# Patient Record
Sex: MALE | Race: WHITE | Employment: FULL TIME | URBAN - METROPOLITAN AREA
[De-identification: names, ages, dates, MRNs, and addresses within clinical notes are randomized per-mention and may not be internally consistent; named-entity substitution may affect disease eponyms.]

---

## 2022-12-06 ENCOUNTER — TELEPHONE (OUTPATIENT)
Dept: INTERNAL MEDICINE CLINIC | Age: 67
End: 2022-12-06

## 2023-03-02 ENCOUNTER — OFFICE VISIT (OUTPATIENT)
Dept: INTERNAL MEDICINE CLINIC | Age: 68
End: 2023-03-02
Payer: SELF-PAY

## 2023-03-02 VITALS
HEIGHT: 70 IN | BODY MASS INDEX: 24.05 KG/M2 | HEART RATE: 78 BPM | SYSTOLIC BLOOD PRESSURE: 134 MMHG | RESPIRATION RATE: 18 BRPM | DIASTOLIC BLOOD PRESSURE: 80 MMHG | TEMPERATURE: 97.6 F | OXYGEN SATURATION: 97 % | WEIGHT: 168 LBS

## 2023-03-02 DIAGNOSIS — G25.0 BENIGN ESSENTIAL TREMOR: ICD-10-CM

## 2023-03-02 DIAGNOSIS — Z12.5 PROSTATE CANCER SCREENING: ICD-10-CM

## 2023-03-02 DIAGNOSIS — M10.9 GOUT, UNSPECIFIED CAUSE, UNSPECIFIED CHRONICITY, UNSPECIFIED SITE: ICD-10-CM

## 2023-03-02 DIAGNOSIS — E78.00 HIGH CHOLESTEROL: ICD-10-CM

## 2023-03-02 DIAGNOSIS — J30.1 ALLERGIC RHINITIS DUE TO POLLEN, UNSPECIFIED SEASONALITY: ICD-10-CM

## 2023-03-02 DIAGNOSIS — Z11.59 NEED FOR HEPATITIS C SCREENING TEST: ICD-10-CM

## 2023-03-02 DIAGNOSIS — Z00.00 MEDICARE ANNUAL WELLNESS VISIT, SUBSEQUENT: Primary | ICD-10-CM

## 2023-03-02 PROCEDURE — G0463 HOSPITAL OUTPT CLINIC VISIT: HCPCS | Performed by: INTERNAL MEDICINE

## 2023-03-02 RX ORDER — TRIAMCINOLONE ACETONIDE 55 UG/1
2 SPRAY, METERED NASAL DAILY
COMMUNITY
Start: 2023-03-02

## 2023-03-02 RX ORDER — TADALAFIL 20 MG/1
20 TABLET ORAL
Qty: 30 TABLET | Refills: 3 | Status: SHIPPED | OUTPATIENT
Start: 2023-03-02

## 2023-03-02 NOTE — PROGRESS NOTES
Chief Complaint   Patient presents with    [facility] Care    Annual Wellness Visit     Blood pressure (!) 167/68, pulse 78, temperature 97.6 °F (36.4 °C), temperature source Temporal, resp. rate 18, height 5' 10.2\" (1.783 m), weight 168 lb (76.2 kg), SpO2 97 %.

## 2023-03-02 NOTE — PATIENT INSTRUCTIONS
Medicare Wellness Visit, Male    The best way to live healthy is to have a lifestyle where you eat a well-balanced diet, exercise regularly, limit alcohol use, and quit all forms of tobacco/nicotine, if applicable. Regular preventive services are another way to keep healthy. Preventive services (vaccines, screening tests, monitoring & exams) can help personalize your care plan, which helps you manage your own care. Screening tests can find health problems at the earliest stages, when they are easiest to treat. [facility] follows the current, evidence-based guidelines published by the [facility] Gurvinder ([facility]) when recommending preventive services for our patients. Because we follow these guidelines, sometimes recommendations change over time as research supports it. (For example, a prostate screening blood test is no longer routinely recommended for men with no symptoms). Of course, you and your doctor may decide to screen more often for some diseases, based on your risk and co-morbidities (chronic disease you are already diagnosed with). Preventive services for you include:  - Medicare offers their members a free annual wellness visit, which is time for you and your primary care provider to discuss and plan for your preventive service needs.  Take advantage of this benefit every year!    -Over the age of 72 should receive the recommended pneumonia vaccines.   -All adults should have a flu vaccine yearly.  -All adults should receive a tetanus vaccine every 10 years.  -Over the age of 48 should receive the shingles vaccines.    -All adults should be screened once for Hepatitis C.  -All adults age 38-68 who are overweight should have a diabetes screening test once every three years.   -Other screening tests & preventive services for persons with diabetes include: an eye exam to screen for diabetic retinopathy, a kidney function test, a foot exam, and stricter control over your cholesterol.   -Cardiovascular screening for adults with routine risk involves an electrocardiogram (ECG) at intervals determined by the provider.     -Colorectal cancer screening should be done for adults age 43-69 with no increased risk factors for colorectal cancer. There are a number of acceptable methods of screening for this type of cancer. Each test has its own benefits and drawbacks. Discuss with your provider what is most appropriate for you during your annual wellness visit. The different tests include: colonoscopy (considered the best screening method), a fecal occult blood test, a fecal DNA test, and sigmoidoscopy.    -Lung cancer screening is recommended annually with a low dose CT scan for adults between age 54 and 68, who have smoked at least 30 pack years (equivalent of 1 pack per day for 30 days), and who is a current smoker or quit less than 15 years ago. -An Abdominal Aortic Aneurysm (AAA) Screening is recommended for men age 73-68 who has ever smoked in their lifetime.      Here is a list of your current Health Maintenance items (your personalized list of preventive services) with a due date:  Health Maintenance Due   Topic Date Due    Hepatitis C Test  Never done    COVID-19 Vaccine (1) Never done    Colorectal Screening  Never done    Shingles Vaccine (1 of 2) Never done    Cholesterol Test   10/08/2015    AAA Screening  Never done    Pneumococcal Vaccine (1 - PCV) Never done    DTaP/Tdap/Td  (2 - Td or Tdap) 10/08/2020    Yearly Flu Vaccine (1) Never done

## 2023-03-02 NOTE — LETTER
3/2/2023 4:20 PM    [address] [place] [address]          Dear Dr. Myrtha Denver,    Please fax us the most recent Colonoscopy & Pathology so that we may update the patient's records for continuity of care.      Our fax number: [phone]    Patient:   Ijeoma Connell  1955              Sincerely,      Clare Laura MD

## 2023-03-02 NOTE — PROGRESS NOTES
This is the Subsequent Medicare Annual Wellness Exam, performed 12 months or more after the Initial AWV or the last Subsequent AWV    I have reviewed the patient's medical history in detail and updated the computerized patient record. Also for a new patient appointment. He is transferring care as his physician retired. He has a history of gout. He has a history of some whitecoat hypertension. He also has a history of erectile dysfunction and has previously used Cialis with good intent. He denies any recent episodes of gout. He is walking an average of 12 miles per day. Has some blisters on his feet intermittently but otherwise a complete review of systems is negative. Physical Examination: General appearance - alert, well appearing, and in no distress  Mental status - alert, oriented to person, place, and time  Ears - bilateral TM's and external ear canals normal  Mouth - mucous membranes moist, pharynx normal without lesions  Neck - supple, no significant adenopathy  Lymphatics - no palpable lymphadenopathy, no hepatosplenomegaly  Chest - clear to auscultation, no wheezes, rales or rhonchi, symmetric air entry  Heart - normal rate, regular rhythm, normal S1, S2, no murmurs, rubs, clicks or gallops  Abdomen - soft, nontender, nondistended, no masses or organomegaly  Neurological - alert, oriented, normal speech, no focal findings or movement disorder noted  Musculoskeletal - no joint tenderness, deformity or swelling  Extremities - peripheral pulses normal, no pedal edema, no clubbing or cyanosis    Assessment/Plan   Education and counseling provided:  Are appropriate based on today's review and evaluation  End-of-Life planning (with patient's consent)  Prostate cancer screening tests (PSA, covered annually)  Diabetes screening test    1. Medicare annual wellness visit, subsequent  2. High cholesterol-diet controlled. At low risk for coronary disease. We will recheck levels.   -     METABOLIC PANEL, COMPREHENSIVE; Future  -     CBC WITH AUTOMATED DIFF; Future  -     LIPID PANEL; Future  -     TSH 3RD GENERATION; Future  3. Gout, unspecified cause, unspecified chronicity, unspecified site-stable on allopurinol.  -     TSH 3RD GENERATION; Future  -     URIC ACID; Future  -     URINALYSIS W/MICROSCOPIC; Future  4. Benign essential tremor-functional with no medications.  -     TSH 3RD GENERATION; Future  -     URINALYSIS W/MICROSCOPIC; Future  5. Allergic rhinitis due to pollen, unspecified seasonality-stable on as needed over-the-counter meds. 6. Prostate cancer screening  -     PSA SCREENING (SCREENING); Future  7. Need for hepatitis C screening test  -     HEPATITIS C AB; Future  8. ED-refill Cialis to use as needed. Depression Risk Factor Screening     3 most recent PHQ Screens 3/2/2023   Little interest or pleasure in doing things Not at all   Feeling down, depressed, irritable, or hopeless Not at all   Total Score PHQ 2 0       Alcohol & Drug Abuse Risk Screen    Do you average more than 1 drink per night or more than 7 drinks a week: No    In the past three months have you have had more than 4 drinks containing alcohol on one occasion: No          Functional Ability and Level of Safety    Hearing: The patient wears hearing aids. Activities of Daily Living: The home contains: no safety equipment. Patient does total self care      Ambulation: with no difficulty     Fall Risk:  Fall Risk Assessment, last 12 mths 3/2/2023   Able to walk? Yes   Fall in past 12 months? 0   Do you feel unsteady?  0   Are you worried about falling 0      Abuse Screen:  Patient is not abused       Cognitive Screening    Has your family/caregiver stated any concerns about your memory: no         Health Maintenance Due     Health Maintenance Due   Topic Date Due    Hepatitis C Screening  Never done    COVID-19 Vaccine (1) Never done    Colorectal Cancer Screening Combo  Never done    Shingles Vaccine (1 of 2) Never done Lipid Screen  10/08/2015    AAA Screening 73-69 YO Male Smoking Patients  Never done    Pneumococcal 65+ years (1 - PCV) Never done    DTaP/Tdap/Td series (2 - Td or Tdap) 10/08/2020    Flu Vaccine (1) Never done       Patient Care Team   Patient Care Team:  Glen Adame MD as PCP - General (Internal Medicine Physician)    History     Patient Active Problem List   Diagnosis Code    Gout M10.9    High cholesterol E78.00    Benign essential tremor G25.0    AR (allergic rhinitis) J30.9     Past Medical History:   Diagnosis Date    AR (allergic rhinitis)     Benign essential tremor     Gout 2010    High cholesterol     Kidney stone     Migraine       Past Surgical History:   Procedure Laterality Date    HX APPENDECTOMY  2003    HX TONSILLECTOMY       Current Outpatient Medications   Medication Sig Dispense Refill    triamcinolone (Nasacort) 55 mcg nasal inhaler 2 Sprays by Both Nostrils route daily. tadalafiL (CIALIS) 20 mg tablet Take 1 Tablet by mouth daily as needed for Erectile Dysfunction. 30 Tablet 3    allopurinol (ZYLOPRIM) 300 mg tablet Take 1 Tab by mouth daily.  80 Tab 3     No Known Allergies    Family History   Problem Relation Age of Onset    Hypertension Mother     Cancer Mother         oral    Cancer Father         prostate    Hypertension Sister     Hypertension Sister      Social History     Tobacco Use    Smoking status: Former     Packs/day: 0.50     Years: 5.00     Pack years: 2.50     Types: Cigarettes     Quit date: 10/8/1979     Years since quittin.4    Smokeless tobacco: Not on file   Substance Use Topics    Alcohol use: No         Matt Seymour MD

## 2023-03-03 ENCOUNTER — PATIENT MESSAGE (OUTPATIENT)
Dept: INTERNAL MEDICINE CLINIC | Age: 68
End: 2023-03-03

## 2023-03-03 LAB
ALBUMIN SERPL-MCNC: 4.5 G/DL (ref 3.5–5)
ALBUMIN/GLOB SERPL: 1.7 (ref 1.1–2.2)
ALP SERPL-CCNC: 66 U/L (ref 45–117)
ALT SERPL-CCNC: 21 U/L (ref 12–78)
ANION GAP SERPL CALC-SCNC: 6 MMOL/L (ref 5–15)
APPEARANCE UR: CLEAR
AST SERPL-CCNC: 24 U/L (ref 15–37)
BACTERIA URNS QL MICRO: NEGATIVE /HPF
BASOPHILS # BLD: 0 K/UL (ref 0–0.1)
BASOPHILS NFR BLD: 0 % (ref 0–1)
BILIRUB SERPL-MCNC: 3 MG/DL (ref 0.2–1)
BILIRUB UR QL: NEGATIVE
BUN SERPL-MCNC: 22 MG/DL (ref 6–20)
BUN/CREAT SERPL: 21 (ref 12–20)
CALCIUM SERPL-MCNC: 9.8 MG/DL (ref 8.5–10.1)
CHLORIDE SERPL-SCNC: 101 MMOL/L (ref 97–108)
CHOLEST SERPL-MCNC: 222 MG/DL
CO2 SERPL-SCNC: 31 MMOL/L (ref 21–32)
COLOR UR: ABNORMAL
CREAT SERPL-MCNC: 1.06 MG/DL (ref 0.7–1.3)
DIFFERENTIAL METHOD BLD: NORMAL
EOSINOPHIL # BLD: 0.1 K/UL (ref 0–0.4)
EOSINOPHIL NFR BLD: 3 % (ref 0–7)
EPITH CASTS URNS QL MICRO: ABNORMAL /LPF
ERYTHROCYTE [DISTWIDTH] IN BLOOD BY AUTOMATED COUNT: 11.9 % (ref 11.5–14.5)
GLOBULIN SER CALC-MCNC: 2.6 G/DL (ref 2–4)
GLUCOSE SERPL-MCNC: 94 MG/DL (ref 65–100)
GLUCOSE UR STRIP.AUTO-MCNC: NEGATIVE MG/DL
HCT VFR BLD AUTO: 47.6 % (ref 36.6–50.3)
HCV AB SERPL QL IA: NONREACTIVE
HDLC SERPL-MCNC: 67 MG/DL
HDLC SERPL: 3.3 (ref 0–5)
HGB BLD-MCNC: 15.5 G/DL (ref 12.1–17)
HGB UR QL STRIP: NEGATIVE
IMM GRANULOCYTES # BLD AUTO: 0 K/UL (ref 0–0.04)
IMM GRANULOCYTES NFR BLD AUTO: 0 % (ref 0–0.5)
KETONES UR QL STRIP.AUTO: NEGATIVE MG/DL
LDLC SERPL CALC-MCNC: 142.2 MG/DL (ref 0–100)
LEUKOCYTE ESTERASE UR QL STRIP.AUTO: ABNORMAL
LYMPHOCYTES # BLD: 1 K/UL (ref 0.8–3.5)
LYMPHOCYTES NFR BLD: 21 % (ref 12–49)
MCH RBC QN AUTO: 31.1 PG (ref 26–34)
MCHC RBC AUTO-ENTMCNC: 32.6 G/DL (ref 30–36.5)
MCV RBC AUTO: 95.6 FL (ref 80–99)
MONOCYTES # BLD: 0.3 K/UL (ref 0–1)
MONOCYTES NFR BLD: 6 % (ref 5–13)
NEUTS SEG # BLD: 3.2 K/UL (ref 1.8–8)
NEUTS SEG NFR BLD: 70 % (ref 32–75)
NITRITE UR QL STRIP.AUTO: NEGATIVE
NRBC # BLD: 0 K/UL (ref 0–0.01)
NRBC BLD-RTO: 0 PER 100 WBC
PH UR STRIP: 6 (ref 5–8)
PLATELET # BLD AUTO: 177 K/UL (ref 150–400)
PMV BLD AUTO: 10.9 FL (ref 8.9–12.9)
POTASSIUM SERPL-SCNC: 4.1 MMOL/L (ref 3.5–5.1)
PROT SERPL-MCNC: 7.1 G/DL (ref 6.4–8.2)
PROT UR STRIP-MCNC: NEGATIVE MG/DL
PSA SERPL-MCNC: 4.4 NG/ML (ref 0.01–4)
RBC # BLD AUTO: 4.98 M/UL (ref 4.1–5.7)
RBC #/AREA URNS HPF: ABNORMAL /HPF (ref 0–5)
SODIUM SERPL-SCNC: 138 MMOL/L (ref 136–145)
SP GR UR REFRACTOMETRY: 1.01 (ref 1–1.03)
TRIGL SERPL-MCNC: 64 MG/DL (ref ?–150)
TSH SERPL DL<=0.05 MIU/L-ACNC: 1.08 UIU/ML (ref 0.36–3.74)
URATE SERPL-MCNC: 4.2 MG/DL (ref 3.5–7.2)
UROBILINOGEN UR QL STRIP.AUTO: 0.2 EU/DL (ref 0.2–1)
VLDLC SERPL CALC-MCNC: 12.8 MG/DL
WBC # BLD AUTO: 4.6 K/UL (ref 4.1–11.1)
WBC URNS QL MICRO: ABNORMAL /HPF (ref 0–4)

## 2023-03-06 NOTE — TELEPHONE ENCOUNTER
Faxed records to Dr. Aury Ochoa at fax number [phone] through [employer]. Notified pt via [facility].

## 2023-08-15 LAB — PSA, EXTERNAL: 4.78

## 2024-05-06 SDOH — ECONOMIC STABILITY: HOUSING INSECURITY
IN THE LAST 12 MONTHS, WAS THERE A TIME WHEN YOU DID NOT HAVE A STEADY PLACE TO SLEEP OR SLEPT IN A SHELTER (INCLUDING NOW)?: NO

## 2024-05-06 SDOH — ECONOMIC STABILITY: FOOD INSECURITY: WITHIN THE PAST 12 MONTHS, YOU WORRIED THAT YOUR FOOD WOULD RUN OUT BEFORE YOU GOT MONEY TO BUY MORE.: NEVER TRUE

## 2024-05-06 SDOH — ECONOMIC STABILITY: TRANSPORTATION INSECURITY
IN THE PAST 12 MONTHS, HAS LACK OF TRANSPORTATION KEPT YOU FROM MEETINGS, WORK, OR FROM GETTING THINGS NEEDED FOR DAILY LIVING?: NO

## 2024-05-06 SDOH — ECONOMIC STABILITY: INCOME INSECURITY: HOW HARD IS IT FOR YOU TO PAY FOR THE VERY BASICS LIKE FOOD, HOUSING, MEDICAL CARE, AND HEATING?: NOT HARD AT ALL

## 2024-05-06 SDOH — HEALTH STABILITY: PHYSICAL HEALTH: ON AVERAGE, HOW MANY DAYS PER WEEK DO YOU ENGAGE IN MODERATE TO STRENUOUS EXERCISE (LIKE A BRISK WALK)?: 7 DAYS

## 2024-05-06 SDOH — ECONOMIC STABILITY: FOOD INSECURITY: WITHIN THE PAST 12 MONTHS, THE FOOD YOU BOUGHT JUST DIDN'T LAST AND YOU DIDN'T HAVE MONEY TO GET MORE.: NEVER TRUE

## 2024-05-06 SDOH — HEALTH STABILITY: PHYSICAL HEALTH: ON AVERAGE, HOW MANY MINUTES DO YOU ENGAGE IN EXERCISE AT THIS LEVEL?: 150+ MIN

## 2024-05-06 ASSESSMENT — LIFESTYLE VARIABLES
HOW MANY STANDARD DRINKS CONTAINING ALCOHOL DO YOU HAVE ON A TYPICAL DAY: PATIENT DOES NOT DRINK
HOW OFTEN DO YOU HAVE A DRINK CONTAINING ALCOHOL: NEVER
HOW OFTEN DO YOU HAVE SIX OR MORE DRINKS ON ONE OCCASION: 1
HOW MANY STANDARD DRINKS CONTAINING ALCOHOL DO YOU HAVE ON A TYPICAL DAY: 0
HOW OFTEN DO YOU HAVE A DRINK CONTAINING ALCOHOL: 1

## 2024-05-06 ASSESSMENT — PATIENT HEALTH QUESTIONNAIRE - PHQ9
1. LITTLE INTEREST OR PLEASURE IN DOING THINGS: NOT AT ALL
SUM OF ALL RESPONSES TO PHQ QUESTIONS 1-9: 0
SUM OF ALL RESPONSES TO PHQ9 QUESTIONS 1 & 2: 0
2. FEELING DOWN, DEPRESSED OR HOPELESS: NOT AT ALL

## 2024-05-09 ENCOUNTER — OFFICE VISIT (OUTPATIENT)
Age: 69
End: 2024-05-09
Payer: MEDICARE

## 2024-05-09 VITALS
OXYGEN SATURATION: 100 % | RESPIRATION RATE: 15 BRPM | DIASTOLIC BLOOD PRESSURE: 82 MMHG | WEIGHT: 168.4 LBS | HEIGHT: 70 IN | HEART RATE: 63 BPM | BODY MASS INDEX: 24.11 KG/M2 | SYSTOLIC BLOOD PRESSURE: 140 MMHG | TEMPERATURE: 98 F

## 2024-05-09 DIAGNOSIS — Z13.6 ENCOUNTER FOR SCREENING FOR ABDOMINAL AORTIC ANEURYSM (AAA) IN PATIENT 50 YEARS OF AGE OR OLDER WITHOUT OTHER RISK FACTORS FOR AAA: ICD-10-CM

## 2024-05-09 DIAGNOSIS — M10.9 GOUT, UNSPECIFIED CAUSE, UNSPECIFIED CHRONICITY, UNSPECIFIED SITE: ICD-10-CM

## 2024-05-09 DIAGNOSIS — Z12.5 ENCOUNTER FOR SCREENING FOR MALIGNANT NEOPLASM OF PROSTATE: ICD-10-CM

## 2024-05-09 DIAGNOSIS — H61.92 SKIN LESION OF LEFT EAR: ICD-10-CM

## 2024-05-09 DIAGNOSIS — F17.211 NICOTINE DEPENDENCE, CIGARETTES, IN REMISSION: ICD-10-CM

## 2024-05-09 DIAGNOSIS — E78.00 PURE HYPERCHOLESTEROLEMIA, UNSPECIFIED: ICD-10-CM

## 2024-05-09 DIAGNOSIS — R97.20 ELEVATED PSA: ICD-10-CM

## 2024-05-09 DIAGNOSIS — J30.1 SEASONAL ALLERGIC RHINITIS DUE TO POLLEN: ICD-10-CM

## 2024-05-09 DIAGNOSIS — Z00.00 MEDICARE ANNUAL WELLNESS VISIT, SUBSEQUENT: Primary | ICD-10-CM

## 2024-05-09 PROCEDURE — G8427 DOCREV CUR MEDS BY ELIG CLIN: HCPCS | Performed by: INTERNAL MEDICINE

## 2024-05-09 PROCEDURE — G0439 PPPS, SUBSEQ VISIT: HCPCS | Performed by: INTERNAL MEDICINE

## 2024-05-09 PROCEDURE — 1123F ACP DISCUSS/DSCN MKR DOCD: CPT | Performed by: INTERNAL MEDICINE

## 2024-05-09 PROCEDURE — 1036F TOBACCO NON-USER: CPT | Performed by: INTERNAL MEDICINE

## 2024-05-09 PROCEDURE — 99214 OFFICE O/P EST MOD 30 MIN: CPT | Performed by: INTERNAL MEDICINE

## 2024-05-09 PROCEDURE — G8420 CALC BMI NORM PARAMETERS: HCPCS | Performed by: INTERNAL MEDICINE

## 2024-05-09 PROCEDURE — 3017F COLORECTAL CA SCREEN DOC REV: CPT | Performed by: INTERNAL MEDICINE

## 2024-05-09 PROCEDURE — 4130F TOPICAL PREP RX AOE: CPT | Performed by: INTERNAL MEDICINE

## 2024-05-09 RX ORDER — BERBERINE CHLOR/SEAWEED/CHROM 500-250 MG
CAPSULE ORAL
COMMUNITY

## 2024-05-09 RX ORDER — DIPHENHYDRAMINE HCL 25 MG
25 CAPSULE ORAL EVERY 6 HOURS PRN
COMMUNITY

## 2024-05-09 NOTE — PROGRESS NOTES
values have been reviewed and are found in Flowsheets. The following problems were reviewed today and where indicated follow up appointments were made and/or referrals ordered.    Positive Risk Factor Screenings with Interventions:                Activity, Diet, and Weight:  On average, how many days per week do you engage in moderate to strenuous exercise (like a brisk walk)?: 7 days  On average, how many minutes do you engage in exercise at this level?: 150+ min    Do you eat balanced/healthy meals regularly?: (!) No    Body mass index is 24.03 kg/m².    Do you eat balanced/healthy meals regularly Interventions:  See AVS for additional education material                           Objective   Vitals:    05/09/24 1326   BP: (!) 171/96   Pulse: 63   Resp: 15   Temp: 98 °F (36.7 °C)   SpO2: 100%   Weight: 76.4 kg (168 lb 6.4 oz)   Height: 1.783 m (5' 10.2\")      Body mass index is 24.03 kg/m².        General Appearance: alert and oriented to person, place and time, well-developed and well-nourished, in no acute distress  Head: normocephalic and atraumatic  ENT: tympanic membrane, external ear and ear canal normal bilaterally, oropharynx clear and moist with normal mucous membranes  Neck: neck supple and non tender without mass, no thyromegaly or thyroid nodules, no cervical lymphadenopathy   Pulmonary/Chest: clear to auscultation bilaterally- no wheezes, rales or rhonchi, normal air movement, no respiratory distress  Cardiovascular: normal rate, normal S1 and S2, no gallops, intact distal pulses, and no carotid bruits  Abdomen: soft, non-tender, non-distended, normal bowel sounds, no masses or organomegaly  Extremities: no cyanosis and no clubbing  Musculoskeletal: normal range of motion, no joint swelling, deformity or tenderness  Neurologic: gait and coordination normal and speech normal  Large cystic structure on the left pinna with a?  Small ulcerated area in the center.      No Known Allergies  Prior to Visit

## 2024-05-09 NOTE — PATIENT INSTRUCTIONS
Eating Healthy Foods: Care Instructions  With every meal, you can make healthy food choices. Try to eat a variety of fruits, vegetables, whole grains, lean proteins, and low-fat dairy products. This can help you get the right balance of nutrients, including vitamins and minerals. Small changes add up over time. You can start by adding one healthy food to your meals each day.    Try to make half your plate fruits and vegetables, one-fourth whole grains, and one-fourth lean proteins. Try including dairy with your meals.   Eat more fruits and vegetables. Try to have them with most meals and snacks.   Foods for healthy eating    Fruits    These can be fresh, frozen, canned, or dried.  Try to choose whole fruit rather than fruit juice.  Eat a variety of colors.    Vegetables    These can be fresh, frozen, canned, or dried.  Beans, peas, and lentils count too.    Whole grains    Choose whole-grain breads, cereals, and noodles.  Try brown rice.    Lean proteins    These can include lean meat, poultry, fish, and eggs.  You can also have tofu, beans, peas, lentils, nuts, and seeds.    Dairy    Try milk, yogurt, and cheese.  Choose low-fat or fat-free when you can.  If you need to, use lactose-free milk or fortified plant-based milk products, such as soy milk.    Water    Drink water when you're thirsty.  Limit sugar-sweetened drinks, including soda, fruit drinks, and sports drinks.  Where can you learn more?  Go to https://www.GetPromotd.net/patientEd and enter T756 to learn more about \"Eating Healthy Foods: Care Instructions.\"  Current as of: September 20, 2023               Content Version: 14.0  © 4465-0597 [employer].   Care instructions adapted under license by [employer]. If you have questions about a medical condition or this instruction, always ask your healthcare professional. [employer] disclaims any warranty or liability for your use of this information.           A Healthy

## 2024-05-10 LAB
ALBUMIN SERPL-MCNC: 4.4 G/DL (ref 3.5–5)
ALBUMIN/GLOB SERPL: 1.5 (ref 1.1–2.2)
ALP SERPL-CCNC: 72 U/L (ref 45–117)
ALT SERPL-CCNC: 18 U/L (ref 12–78)
ANION GAP SERPL CALC-SCNC: 3 MMOL/L (ref 5–15)
AST SERPL-CCNC: 20 U/L (ref 15–37)
BASOPHILS # BLD: 0 K/UL (ref 0–0.1)
BASOPHILS NFR BLD: 1 % (ref 0–1)
BILIRUB SERPL-MCNC: 2.9 MG/DL (ref 0.2–1)
BUN SERPL-MCNC: 24 MG/DL (ref 6–20)
BUN/CREAT SERPL: 25 (ref 12–20)
CALCIUM SERPL-MCNC: 10 MG/DL (ref 8.5–10.1)
CHLORIDE SERPL-SCNC: 103 MMOL/L (ref 97–108)
CHOLEST SERPL-MCNC: 214 MG/DL
CO2 SERPL-SCNC: 33 MMOL/L (ref 21–32)
CREAT SERPL-MCNC: 0.96 MG/DL (ref 0.7–1.3)
DIFFERENTIAL METHOD BLD: NORMAL
EOSINOPHIL # BLD: 0.2 K/UL (ref 0–0.4)
EOSINOPHIL NFR BLD: 3 % (ref 0–7)
ERYTHROCYTE [DISTWIDTH] IN BLOOD BY AUTOMATED COUNT: 11.9 % (ref 11.5–14.5)
GLOBULIN SER CALC-MCNC: 2.9 G/DL (ref 2–4)
GLUCOSE SERPL-MCNC: 99 MG/DL (ref 65–100)
HCT VFR BLD AUTO: 44.7 % (ref 36.6–50.3)
HDLC SERPL-MCNC: 69 MG/DL
HDLC SERPL: 3.1 (ref 0–5)
HGB BLD-MCNC: 15.6 G/DL (ref 12.1–17)
IMM GRANULOCYTES # BLD AUTO: 0 K/UL (ref 0–0.04)
IMM GRANULOCYTES NFR BLD AUTO: 0 % (ref 0–0.5)
LDLC SERPL CALC-MCNC: 124.4 MG/DL (ref 0–100)
LYMPHOCYTES # BLD: 1.2 K/UL (ref 0.8–3.5)
LYMPHOCYTES NFR BLD: 23 % (ref 12–49)
MCH RBC QN AUTO: 31.5 PG (ref 26–34)
MCHC RBC AUTO-ENTMCNC: 34.9 G/DL (ref 30–36.5)
MCV RBC AUTO: 90.1 FL (ref 80–99)
MONOCYTES # BLD: 0.3 K/UL (ref 0–1)
MONOCYTES NFR BLD: 6 % (ref 5–13)
NEUTS SEG # BLD: 3.6 K/UL (ref 1.8–8)
NEUTS SEG NFR BLD: 67 % (ref 32–75)
NRBC # BLD: 0 K/UL (ref 0–0.01)
NRBC BLD-RTO: 0 PER 100 WBC
PLATELET # BLD AUTO: 172 K/UL (ref 150–400)
PMV BLD AUTO: 10.7 FL (ref 8.9–12.9)
POTASSIUM SERPL-SCNC: 4.6 MMOL/L (ref 3.5–5.1)
PROT SERPL-MCNC: 7.3 G/DL (ref 6.4–8.2)
PSA SERPL-MCNC: 3.9 NG/ML (ref 0.01–4)
RBC # BLD AUTO: 4.96 M/UL (ref 4.1–5.7)
SODIUM SERPL-SCNC: 139 MMOL/L (ref 136–145)
TRIGL SERPL-MCNC: 103 MG/DL
TSH SERPL DL<=0.05 MIU/L-ACNC: 1.01 UIU/ML (ref 0.36–3.74)
URATE SERPL-MCNC: 4.7 MG/DL (ref 3.5–7.2)
VLDLC SERPL CALC-MCNC: 20.6 MG/DL
WBC # BLD AUTO: 5.3 K/UL (ref 4.1–11.1)

## 2024-05-20 ENCOUNTER — HOSPITAL ENCOUNTER (OUTPATIENT)
Facility: HOSPITAL | Age: 69
Discharge: HOME | End: 2024-05-23
Attending: INTERNAL MEDICINE
Payer: MEDICARE

## 2024-05-20 DIAGNOSIS — F17.211 NICOTINE DEPENDENCE, CIGARETTES, IN REMISSION: ICD-10-CM

## 2024-05-20 DIAGNOSIS — Z13.6 ENCOUNTER FOR SCREENING FOR ABDOMINAL AORTIC ANEURYSM (AAA) IN PATIENT 50 YEARS OF AGE OR OLDER WITHOUT OTHER RISK FACTORS FOR AAA: ICD-10-CM

## 2024-05-20 LAB
VAS AORTA DIST AP: 1.7 CM
VAS AORTA DIST TR: 2.1 CM
VAS AORTA MID AP: 1.7 CM
VAS AORTA MID TRANS: 1.7 CM
VAS AORTA PROX AP: 2.7 CM
VAS AORTA PROX TR: 2.3 CM
VAS LEFT COM ILIAC AP: 1.1 CM
VAS LEFT COM ILIAC TRANS: 1.3 CM
VAS RIGHT COM ILIAC AP: 1 CM
VAS RIGHT COM ILIAC TRANS: 1.2 CM

## 2024-05-20 PROCEDURE — 76706 US ABDL AORTA SCREEN AAA: CPT

## 2024-09-14 ENCOUNTER — HOSPITAL ENCOUNTER (EMERGENCY)
Facility: HOSPITAL | Age: 69
Discharge: HOME | End: 2024-09-14
Attending: EMERGENCY MEDICINE
Payer: MEDICARE

## 2024-09-14 ENCOUNTER — APPOINTMENT (OUTPATIENT)
Facility: HOSPITAL | Age: 69
End: 2024-09-14
Payer: MEDICARE

## 2024-09-14 VITALS
BODY MASS INDEX: 22.69 KG/M2 | OXYGEN SATURATION: 95 % | HEART RATE: 103 BPM | HEIGHT: 71 IN | WEIGHT: 162.04 LBS | RESPIRATION RATE: 18 BRPM | TEMPERATURE: 98.9 F | DIASTOLIC BLOOD PRESSURE: 79 MMHG | SYSTOLIC BLOOD PRESSURE: 121 MMHG

## 2024-09-14 DIAGNOSIS — J18.9 COMMUNITY ACQUIRED PNEUMONIA OF LEFT LOWER LOBE OF LUNG: Primary | ICD-10-CM

## 2024-09-14 LAB
ALBUMIN SERPL-MCNC: 3.7 G/DL (ref 3.5–5)
ALBUMIN/GLOB SERPL: 0.8 (ref 1.1–2.2)
ALP SERPL-CCNC: 66 U/L (ref 45–117)
ALT SERPL-CCNC: 19 U/L (ref 12–78)
ANION GAP SERPL CALC-SCNC: 10 MMOL/L (ref 2–12)
AST SERPL-CCNC: 22 U/L (ref 15–37)
BASOPHILS # BLD: 0 K/UL (ref 0–0.1)
BASOPHILS NFR BLD: 0 % (ref 0–1)
BILIRUB SERPL-MCNC: 2.7 MG/DL (ref 0.2–1)
BUN SERPL-MCNC: 32 MG/DL (ref 6–20)
BUN/CREAT SERPL: 22 (ref 12–20)
CALCIUM SERPL-MCNC: 10 MG/DL (ref 8.5–10.1)
CHLORIDE SERPL-SCNC: 99 MMOL/L (ref 97–108)
CO2 SERPL-SCNC: 30 MMOL/L (ref 21–32)
COMMENT:: NORMAL
CREAT SERPL-MCNC: 1.47 MG/DL (ref 0.7–1.3)
DIFFERENTIAL METHOD BLD: ABNORMAL
EKG ATRIAL RATE: 129 BPM
EKG DIAGNOSIS: NORMAL
EKG P AXIS: 75 DEGREES
EKG P-R INTERVAL: 124 MS
EKG Q-T INTERVAL: 336 MS
EKG QRS DURATION: 84 MS
EKG QTC CALCULATION (BAZETT): 492 MS
EKG R AXIS: -59 DEGREES
EKG T AXIS: 33 DEGREES
EKG VENTRICULAR RATE: 129 BPM
EOSINOPHIL # BLD: 0 K/UL (ref 0–0.4)
EOSINOPHIL NFR BLD: 0 % (ref 0–7)
ERYTHROCYTE [DISTWIDTH] IN BLOOD BY AUTOMATED COUNT: 11.9 % (ref 11.5–14.5)
FLUAV RNA SPEC QL NAA+PROBE: NOT DETECTED
FLUBV RNA SPEC QL NAA+PROBE: NOT DETECTED
GLOBULIN SER CALC-MCNC: 4.5 G/DL (ref 2–4)
GLUCOSE SERPL-MCNC: 125 MG/DL (ref 65–100)
HCT VFR BLD AUTO: 46.1 % (ref 36.6–50.3)
HGB BLD-MCNC: 16 G/DL (ref 12.1–17)
IMM GRANULOCYTES # BLD AUTO: 0 K/UL (ref 0–0.04)
IMM GRANULOCYTES NFR BLD AUTO: 0 % (ref 0–0.5)
LACTATE SERPL-SCNC: 1.8 MMOL/L (ref 0.4–2)
LYMPHOCYTES # BLD: 0.4 K/UL (ref 0.8–3.5)
LYMPHOCYTES NFR BLD: 5 % (ref 12–49)
MCH RBC QN AUTO: 31.2 PG (ref 26–34)
MCHC RBC AUTO-ENTMCNC: 34.7 G/DL (ref 30–36.5)
MCV RBC AUTO: 89.9 FL (ref 80–99)
MONOCYTES # BLD: 0.4 K/UL (ref 0–1)
MONOCYTES NFR BLD: 5 % (ref 5–13)
NEUTS SEG # BLD: 6.9 K/UL (ref 1.8–8)
NEUTS SEG NFR BLD: 90 % (ref 32–75)
NRBC # BLD: 0 K/UL (ref 0–0.01)
NRBC BLD-RTO: 0 PER 100 WBC
PLATELET # BLD AUTO: 174 K/UL (ref 150–400)
PMV BLD AUTO: 10.3 FL (ref 8.9–12.9)
POTASSIUM SERPL-SCNC: 4 MMOL/L (ref 3.5–5.1)
PROT SERPL-MCNC: 8.2 G/DL (ref 6.4–8.2)
RBC # BLD AUTO: 5.13 M/UL (ref 4.1–5.7)
RBC MORPH BLD: ABNORMAL
SARS-COV-2 RNA RESP QL NAA+PROBE: NOT DETECTED
SODIUM SERPL-SCNC: 139 MMOL/L (ref 136–145)
SOURCE: NORMAL
SPECIMEN HOLD: NORMAL
WBC # BLD AUTO: 7.7 K/UL (ref 4.1–11.1)

## 2024-09-14 PROCEDURE — 85025 COMPLETE CBC W/AUTO DIFF WBC: CPT

## 2024-09-14 PROCEDURE — 71250 CT THORAX DX C-: CPT

## 2024-09-14 PROCEDURE — 36415 COLL VENOUS BLD VENIPUNCTURE: CPT

## 2024-09-14 PROCEDURE — 80053 COMPREHEN METABOLIC PANEL: CPT

## 2024-09-14 PROCEDURE — 6370000000 HC RX 637 (ALT 250 FOR IP): Performed by: EMERGENCY MEDICINE

## 2024-09-14 PROCEDURE — 71046 X-RAY EXAM CHEST 2 VIEWS: CPT

## 2024-09-14 PROCEDURE — 93010 ELECTROCARDIOGRAM REPORT: CPT | Performed by: INTERNAL MEDICINE

## 2024-09-14 PROCEDURE — 83605 ASSAY OF LACTIC ACID: CPT

## 2024-09-14 PROCEDURE — 87636 SARSCOV2 & INF A&B AMP PRB: CPT

## 2024-09-14 PROCEDURE — 99285 EMERGENCY DEPT VISIT HI MDM: CPT

## 2024-09-14 PROCEDURE — 2580000003 HC RX 258: Performed by: EMERGENCY MEDICINE

## 2024-09-14 PROCEDURE — 93005 ELECTROCARDIOGRAM TRACING: CPT | Performed by: EMERGENCY MEDICINE

## 2024-09-14 PROCEDURE — 6360000002 HC RX W HCPCS: Performed by: EMERGENCY MEDICINE

## 2024-09-14 PROCEDURE — 87040 BLOOD CULTURE FOR BACTERIA: CPT

## 2024-09-14 PROCEDURE — 96361 HYDRATE IV INFUSION ADD-ON: CPT

## 2024-09-14 PROCEDURE — 96374 THER/PROPH/DIAG INJ IV PUSH: CPT

## 2024-09-14 RX ORDER — ALBUTEROL SULFATE 90 UG/1
2 INHALANT RESPIRATORY (INHALATION) EVERY 6 HOURS PRN
Qty: 18 G | Refills: 3 | Status: SHIPPED | OUTPATIENT
Start: 2024-09-14

## 2024-09-14 RX ORDER — 0.9 % SODIUM CHLORIDE 0.9 %
1000 INTRAVENOUS SOLUTION INTRAVENOUS ONCE
Status: COMPLETED | OUTPATIENT
Start: 2024-09-14 | End: 2024-09-14

## 2024-09-14 RX ORDER — AZITHROMYCIN 500 MG/1
500 TABLET, FILM COATED ORAL DAILY
Qty: 3 TABLET | Refills: 0 | Status: SHIPPED | OUTPATIENT
Start: 2024-09-14 | End: 2024-09-17

## 2024-09-14 RX ORDER — ACETAMINOPHEN 500 MG
1000 TABLET ORAL
Status: COMPLETED | OUTPATIENT
Start: 2024-09-14 | End: 2024-09-14

## 2024-09-14 RX ORDER — KETOROLAC TROMETHAMINE 30 MG/ML
15 INJECTION, SOLUTION INTRAMUSCULAR; INTRAVENOUS
Status: COMPLETED | OUTPATIENT
Start: 2024-09-14 | End: 2024-09-14

## 2024-09-14 RX ADMIN — ACETAMINOPHEN 1000 MG: 500 TABLET ORAL at 11:17

## 2024-09-14 RX ADMIN — SODIUM CHLORIDE 1000 ML: 9 INJECTION, SOLUTION INTRAVENOUS at 11:30

## 2024-09-14 RX ADMIN — KETOROLAC TROMETHAMINE 15 MG: 30 INJECTION, SOLUTION INTRAMUSCULAR at 12:24

## 2024-09-14 ASSESSMENT — ENCOUNTER SYMPTOMS
CONSTIPATION: 0
SORE THROAT: 0
SHORTNESS OF BREATH: 0
COUGH: 1

## 2024-09-14 ASSESSMENT — PAIN SCALES - GENERAL
PAINLEVEL_OUTOF10: 0
PAINLEVEL_OUTOF10: 2

## 2024-09-14 ASSESSMENT — LIFESTYLE VARIABLES
HOW MANY STANDARD DRINKS CONTAINING ALCOHOL DO YOU HAVE ON A TYPICAL DAY: PATIENT DOES NOT DRINK
HOW OFTEN DO YOU HAVE A DRINK CONTAINING ALCOHOL: NEVER

## 2024-09-14 ASSESSMENT — PAIN DESCRIPTION - LOCATION: LOCATION: HEAD

## 2024-09-14 ASSESSMENT — PAIN DESCRIPTION - ORIENTATION: ORIENTATION: ANTERIOR

## 2024-09-14 ASSESSMENT — PAIN DESCRIPTION - DESCRIPTORS: DESCRIPTORS: DULL

## 2024-09-15 LAB
BACTERIA SPEC CULT: NORMAL
BACTERIA SPEC CULT: NORMAL
SERVICE CMNT-IMP: NORMAL
SERVICE CMNT-IMP: NORMAL

## 2024-09-19 ENCOUNTER — OFFICE VISIT (OUTPATIENT)
Age: 69
End: 2024-09-19
Payer: MEDICARE

## 2024-09-19 VITALS
HEIGHT: 71 IN | BODY MASS INDEX: 23.04 KG/M2 | DIASTOLIC BLOOD PRESSURE: 92 MMHG | WEIGHT: 164.6 LBS | TEMPERATURE: 97.9 F | RESPIRATION RATE: 15 BRPM | HEART RATE: 81 BPM | OXYGEN SATURATION: 100 % | SYSTOLIC BLOOD PRESSURE: 132 MMHG

## 2024-09-19 DIAGNOSIS — E86.0 DEHYDRATION: ICD-10-CM

## 2024-09-19 DIAGNOSIS — J18.9 PNEUMONIA OF LEFT LOWER LOBE DUE TO INFECTIOUS ORGANISM: Primary | ICD-10-CM

## 2024-09-19 DIAGNOSIS — N17.9 ACUTE RENAL FAILURE, UNSPECIFIED ACUTE RENAL FAILURE TYPE (HCC): ICD-10-CM

## 2024-09-19 PROCEDURE — 3017F COLORECTAL CA SCREEN DOC REV: CPT | Performed by: INTERNAL MEDICINE

## 2024-09-19 PROCEDURE — 99214 OFFICE O/P EST MOD 30 MIN: CPT | Performed by: INTERNAL MEDICINE

## 2024-09-19 PROCEDURE — 1123F ACP DISCUSS/DSCN MKR DOCD: CPT | Performed by: INTERNAL MEDICINE

## 2024-09-19 PROCEDURE — G8427 DOCREV CUR MEDS BY ELIG CLIN: HCPCS | Performed by: INTERNAL MEDICINE

## 2024-09-19 PROCEDURE — 1036F TOBACCO NON-USER: CPT | Performed by: INTERNAL MEDICINE

## 2024-09-19 PROCEDURE — G8420 CALC BMI NORM PARAMETERS: HCPCS | Performed by: INTERNAL MEDICINE

## 2024-09-19 RX ORDER — ALLOPURINOL 100 MG/1
100 TABLET ORAL DAILY
Qty: 90 TABLET | Refills: 1 | Status: SHIPPED | OUTPATIENT
Start: 2024-09-19

## 2024-09-27 DIAGNOSIS — E86.0 DEHYDRATION: ICD-10-CM

## 2024-09-27 DIAGNOSIS — N17.9 ACUTE RENAL FAILURE, UNSPECIFIED ACUTE RENAL FAILURE TYPE (HCC): ICD-10-CM

## 2024-09-27 LAB
ANION GAP SERPL CALC-SCNC: 0 MMOL/L (ref 2–12)
BUN SERPL-MCNC: 22 MG/DL (ref 6–20)
BUN/CREAT SERPL: 20 (ref 12–20)
CALCIUM SERPL-MCNC: 9.5 MG/DL (ref 8.5–10.1)
CHLORIDE SERPL-SCNC: 105 MMOL/L (ref 97–108)
CO2 SERPL-SCNC: 34 MMOL/L (ref 21–32)
CREAT SERPL-MCNC: 1.08 MG/DL (ref 0.7–1.3)
GLUCOSE SERPL-MCNC: 96 MG/DL (ref 65–100)
POTASSIUM SERPL-SCNC: 4.7 MMOL/L (ref 3.5–5.1)
SODIUM SERPL-SCNC: 139 MMOL/L (ref 136–145)

## 2025-05-08 SDOH — HEALTH STABILITY: PHYSICAL HEALTH: ON AVERAGE, HOW MANY MINUTES DO YOU ENGAGE IN EXERCISE AT THIS LEVEL?: 150+ MIN

## 2025-05-08 SDOH — HEALTH STABILITY: PHYSICAL HEALTH: ON AVERAGE, HOW MANY DAYS PER WEEK DO YOU ENGAGE IN MODERATE TO STRENUOUS EXERCISE (LIKE A BRISK WALK)?: 7 DAYS

## 2025-05-08 ASSESSMENT — LIFESTYLE VARIABLES
HOW OFTEN DO YOU HAVE SIX OR MORE DRINKS ON ONE OCCASION: 1
HOW OFTEN DO YOU HAVE A DRINK CONTAINING ALCOHOL: 1
HOW OFTEN DO YOU HAVE A DRINK CONTAINING ALCOHOL: NEVER
HOW MANY STANDARD DRINKS CONTAINING ALCOHOL DO YOU HAVE ON A TYPICAL DAY: PATIENT DOES NOT DRINK
HOW MANY STANDARD DRINKS CONTAINING ALCOHOL DO YOU HAVE ON A TYPICAL DAY: 0

## 2025-05-08 ASSESSMENT — PATIENT HEALTH QUESTIONNAIRE - PHQ9
SUM OF ALL RESPONSES TO PHQ QUESTIONS 1-9: 0
2. FEELING DOWN, DEPRESSED OR HOPELESS: NOT AT ALL
SUM OF ALL RESPONSES TO PHQ QUESTIONS 1-9: 0
1. LITTLE INTEREST OR PLEASURE IN DOING THINGS: NOT AT ALL

## 2025-05-12 ENCOUNTER — OFFICE VISIT (OUTPATIENT)
Age: 70
End: 2025-05-12
Payer: MEDICARE

## 2025-05-12 VITALS
HEART RATE: 73 BPM | RESPIRATION RATE: 15 BRPM | HEIGHT: 71 IN | BODY MASS INDEX: 23.24 KG/M2 | WEIGHT: 166 LBS | TEMPERATURE: 98.2 F | OXYGEN SATURATION: 100 % | SYSTOLIC BLOOD PRESSURE: 130 MMHG | DIASTOLIC BLOOD PRESSURE: 72 MMHG

## 2025-05-12 DIAGNOSIS — G25.0 BENIGN ESSENTIAL TREMOR: ICD-10-CM

## 2025-05-12 DIAGNOSIS — Z12.5 ENCOUNTER FOR SCREENING FOR MALIGNANT NEOPLASM OF PROSTATE: ICD-10-CM

## 2025-05-12 DIAGNOSIS — J30.1 ALLERGIC RHINITIS DUE TO POLLEN, UNSPECIFIED SEASONALITY: ICD-10-CM

## 2025-05-12 DIAGNOSIS — M10.9 GOUT, UNSPECIFIED CAUSE, UNSPECIFIED CHRONICITY, UNSPECIFIED SITE: ICD-10-CM

## 2025-05-12 DIAGNOSIS — E78.00 HIGH CHOLESTEROL: ICD-10-CM

## 2025-05-12 DIAGNOSIS — R97.20 ELEVATED PSA: ICD-10-CM

## 2025-05-12 DIAGNOSIS — Z00.00 MEDICARE ANNUAL WELLNESS VISIT, SUBSEQUENT: Primary | ICD-10-CM

## 2025-05-12 LAB
ALBUMIN SERPL-MCNC: 4.1 G/DL (ref 3.5–5)
ALBUMIN/GLOB SERPL: 1.6 (ref 1.1–2.2)
ALP SERPL-CCNC: 73 U/L (ref 45–117)
ALT SERPL-CCNC: 16 U/L (ref 12–78)
ANION GAP SERPL CALC-SCNC: 4 MMOL/L (ref 2–12)
AST SERPL-CCNC: 24 U/L (ref 15–37)
BASOPHILS # BLD: 0.03 K/UL (ref 0–0.1)
BASOPHILS NFR BLD: 0.7 % (ref 0–1)
BILIRUB SERPL-MCNC: 2.4 MG/DL (ref 0.2–1)
BUN SERPL-MCNC: 25 MG/DL (ref 6–20)
BUN/CREAT SERPL: 23 (ref 12–20)
CALCIUM SERPL-MCNC: 9.9 MG/DL (ref 8.5–10.1)
CHLORIDE SERPL-SCNC: 104 MMOL/L (ref 97–108)
CHOLEST SERPL-MCNC: 216 MG/DL
CO2 SERPL-SCNC: 31 MMOL/L (ref 21–32)
CREAT SERPL-MCNC: 1.09 MG/DL (ref 0.7–1.3)
DIFFERENTIAL METHOD BLD: NORMAL
EOSINOPHIL # BLD: 0.15 K/UL (ref 0–0.4)
EOSINOPHIL NFR BLD: 3.6 % (ref 0–7)
ERYTHROCYTE [DISTWIDTH] IN BLOOD BY AUTOMATED COUNT: 11.9 % (ref 11.5–14.5)
GLOBULIN SER CALC-MCNC: 2.6 G/DL (ref 2–4)
GLUCOSE SERPL-MCNC: 83 MG/DL (ref 65–100)
HCT VFR BLD AUTO: 46.3 % (ref 36.6–50.3)
HDLC SERPL-MCNC: 74 MG/DL
HDLC SERPL: 2.9 (ref 0–5)
HGB BLD-MCNC: 15.6 G/DL (ref 12.1–17)
IMM GRANULOCYTES # BLD AUTO: 0.01 K/UL (ref 0–0.04)
IMM GRANULOCYTES NFR BLD AUTO: 0.2 % (ref 0–0.5)
LDLC SERPL CALC-MCNC: 125.8 MG/DL (ref 0–100)
LYMPHOCYTES # BLD: 0.91 K/UL (ref 0.8–3.5)
LYMPHOCYTES NFR BLD: 21.8 % (ref 12–49)
MCH RBC QN AUTO: 31 PG (ref 26–34)
MCHC RBC AUTO-ENTMCNC: 33.7 G/DL (ref 30–36.5)
MCV RBC AUTO: 91.9 FL (ref 80–99)
MONOCYTES # BLD: 0.26 K/UL (ref 0–1)
MONOCYTES NFR BLD: 6.2 % (ref 5–13)
NEUTS SEG # BLD: 2.81 K/UL (ref 1.8–8)
NEUTS SEG NFR BLD: 67.5 % (ref 32–75)
NRBC # BLD: 0 K/UL (ref 0–0.01)
NRBC BLD-RTO: 0 PER 100 WBC
PLATELET # BLD AUTO: 160 K/UL (ref 150–400)
PMV BLD AUTO: 10.7 FL (ref 8.9–12.9)
POTASSIUM SERPL-SCNC: 5.3 MMOL/L (ref 3.5–5.1)
PROT SERPL-MCNC: 6.7 G/DL (ref 6.4–8.2)
PSA SERPL-MCNC: 3.4 NG/ML (ref 0.01–4)
RBC # BLD AUTO: 5.04 M/UL (ref 4.1–5.7)
SODIUM SERPL-SCNC: 139 MMOL/L (ref 136–145)
TRIGL SERPL-MCNC: 81 MG/DL
TSH SERPL DL<=0.05 MIU/L-ACNC: 1.2 UIU/ML (ref 0.36–3.74)
URATE SERPL-MCNC: 5.2 MG/DL (ref 3.5–7.2)
VLDLC SERPL CALC-MCNC: 16.2 MG/DL
WBC # BLD AUTO: 4.2 K/UL (ref 4.1–11.1)

## 2025-05-12 PROCEDURE — 1160F RVW MEDS BY RX/DR IN RCRD: CPT | Performed by: INTERNAL MEDICINE

## 2025-05-12 PROCEDURE — G8427 DOCREV CUR MEDS BY ELIG CLIN: HCPCS | Performed by: INTERNAL MEDICINE

## 2025-05-12 PROCEDURE — 1126F AMNT PAIN NOTED NONE PRSNT: CPT | Performed by: INTERNAL MEDICINE

## 2025-05-12 PROCEDURE — G0439 PPPS, SUBSEQ VISIT: HCPCS | Performed by: INTERNAL MEDICINE

## 2025-05-12 PROCEDURE — 1123F ACP DISCUSS/DSCN MKR DOCD: CPT | Performed by: INTERNAL MEDICINE

## 2025-05-12 PROCEDURE — 99214 OFFICE O/P EST MOD 30 MIN: CPT | Performed by: INTERNAL MEDICINE

## 2025-05-12 PROCEDURE — 1159F MED LIST DOCD IN RCRD: CPT | Performed by: INTERNAL MEDICINE

## 2025-05-12 PROCEDURE — G8420 CALC BMI NORM PARAMETERS: HCPCS | Performed by: INTERNAL MEDICINE

## 2025-05-12 PROCEDURE — 3017F COLORECTAL CA SCREEN DOC REV: CPT | Performed by: INTERNAL MEDICINE

## 2025-05-12 PROCEDURE — 1036F TOBACCO NON-USER: CPT | Performed by: INTERNAL MEDICINE

## 2025-05-12 SDOH — ECONOMIC STABILITY: INCOME INSECURITY: IN THE LAST 12 MONTHS, WAS THERE A TIME WHEN YOU WERE NOT ABLE TO PAY THE MORTGAGE OR RENT ON TIME?: NO

## 2025-05-12 SDOH — ECONOMIC STABILITY: FOOD INSECURITY: WITHIN THE PAST 12 MONTHS, YOU WORRIED THAT YOUR FOOD WOULD RUN OUT BEFORE YOU GOT MONEY TO BUY MORE.: NEVER TRUE

## 2025-05-12 SDOH — ECONOMIC STABILITY: FOOD INSECURITY: WITHIN THE PAST 12 MONTHS, THE FOOD YOU BOUGHT JUST DIDN'T LAST AND YOU DIDN'T HAVE MONEY TO GET MORE.: NEVER TRUE

## 2025-05-12 SDOH — ECONOMIC STABILITY: TRANSPORTATION INSECURITY
IN THE PAST 12 MONTHS, HAS THE LACK OF TRANSPORTATION KEPT YOU FROM MEDICAL APPOINTMENTS OR FROM GETTING MEDICATIONS?: NO

## 2025-05-12 NOTE — PATIENT INSTRUCTIONS
Chest pain or pressure, or a strange feeling in the chest.     Sweating.     Shortness of breath.     Pain, pressure, or a strange feeling in the back, neck, jaw, or upper belly or in one or both shoulders or arms.     Lightheadedness or sudden weakness.     A fast or irregular heartbeat.   After you call 911, the  may tell you to chew 1 adult-strength or 2 to 4 low-dose aspirin. Wait for an ambulance. Do not try to drive yourself.  Watch closely for changes in your health, and be sure to contact your doctor if you have any problems.  Where can you learn more?  Go to https://www.mechatronic systemtechnik.net/patientEd and enter F075 to learn more about \"A Healthy Heart: Care Instructions.\"  Current as of: July 31, 2024  Content Version: 14.4  © 1968-7085 [employer].   Care instructions adapted under license by [employer]. If you have questions about a medical condition or this instruction, always ask your healthcare professional. [employer], disclaims any warranty or liability for your use of this information.    Personalized Preventive Plan for Manoj Mclaughlin - 5/12/2025  Medicare offers a range of preventive health benefits. Some of the tests and screenings are paid in full while other may be subject to a deductible, co-insurance, and/or copay.  Some of these benefits include a comprehensive review of your medical history including lifestyle, illnesses that may run in your family, and various assessments and screenings as appropriate.  After reviewing your medical record and screening and assessments performed today your provider may have ordered immunizations, labs, imaging, and/or referrals for you.  A list of these orders (if applicable) as well as your Preventive Care list are included within your After Visit Summary for your review.

## 2025-05-12 NOTE — PROGRESS NOTES
Medicare Annual Wellness Visit    Manoj Mclaughlin is here for Medicare AWV    Assessment & Plan   Medicare annual wellness visit, subsequent  High cholesterol-diet controlled.  Will check labs to be sure that is adequate.  -     ESTABLISHED, MOD MDM, 30-39 MIN [[id number]]  -     Comprehensive Metabolic Panel; Future  -     CBC with Auto Differential; Future  -     Lipid Panel; Future  -     TSH; Future  Gout, unspecified cause, unspecified chronicity, unspecified site-no recurrence.  Check uric acid level and continue allopurinol.  -     ESTABLISHED, MOD MDM, 30-39 MIN [[id number]]  -     Uric Acid; Future  -     TSH; Future  Benign essential tremor-stable on no medications.  -     ESTABLISHED, MOD MDM, 30-39 MIN [[id number]]  Allergic rhinitis due to pollen, unspecified seasonality-stable on Nasacort and Benadryl as needed.  -     ESTABLISHED, MOD MDM, 30-39 MIN [[id number]]  Elevated PSA-recheck PSA.  Previously evaluated by urology.  If stable, no further follow-up other than yearly PSAs.  -     ESTABLISHED, MOD MDM, 30-39 MIN [[id number]]  -     PSA Screening; Future  Encounter for screening for malignant neoplasm of prostate  -     PSA Screening; Future       Return in 1 year (on 5/12/2026) for Medicare AWV.     Subjective   Presents for a wellness exam as well as a follow-up.  Has been feeling reasonably well.  Denies any headache or dizziness.  No nosebleeds.    Patient's complete Health Risk Assessment and screening values have been reviewed and are found in Flowsheets. The following problems were reviewed today and where indicated follow up appointments were made and/or referrals ordered.    Positive Risk Factor Screenings with Interventions:               Poor Eating Habits/Diet:  Do you eat balanced/healthy meals regularly?: (!) (Patient-Rptd) No  Interventions:  See AVS for additional education material                        Objective   Vitals:    05/12/25 0755   BP: (!) 150/95   Pulse: 73   Resp: 15   Temp: 98.2 °F (36.8 °C)

## 2025-05-13 ENCOUNTER — RESULTS FOLLOW-UP (OUTPATIENT)
Age: 70
End: 2025-05-13